# Patient Record
Sex: MALE | Race: ASIAN | NOT HISPANIC OR LATINO
[De-identification: names, ages, dates, MRNs, and addresses within clinical notes are randomized per-mention and may not be internally consistent; named-entity substitution may affect disease eponyms.]

---

## 2022-05-13 ENCOUNTER — NON-APPOINTMENT (OUTPATIENT)
Age: 42
End: 2022-05-13

## 2022-05-17 ENCOUNTER — APPOINTMENT (OUTPATIENT)
Dept: PULMONOLOGY | Facility: CLINIC | Age: 42
End: 2022-05-17
Payer: COMMERCIAL

## 2022-05-17 VITALS
WEIGHT: 175 LBS | BODY MASS INDEX: 24.5 KG/M2 | SYSTOLIC BLOOD PRESSURE: 134 MMHG | TEMPERATURE: 97.9 F | DIASTOLIC BLOOD PRESSURE: 77 MMHG | HEART RATE: 90 BPM | HEIGHT: 71 IN | OXYGEN SATURATION: 97 %

## 2022-05-17 DIAGNOSIS — R05.3 CHRONIC COUGH: ICD-10-CM

## 2022-05-17 DIAGNOSIS — J40 BRONCHITIS, NOT SPECIFIED AS ACUTE OR CHRONIC: ICD-10-CM

## 2022-05-17 PROBLEM — Z00.00 ENCOUNTER FOR PREVENTIVE HEALTH EXAMINATION: Status: ACTIVE | Noted: 2022-05-17

## 2022-05-17 PROCEDURE — 71046 X-RAY EXAM CHEST 2 VIEWS: CPT

## 2022-05-17 PROCEDURE — 94010 BREATHING CAPACITY TEST: CPT

## 2022-05-17 PROCEDURE — 99202 OFFICE O/P NEW SF 15 MIN: CPT | Mod: 25

## 2022-05-17 RX ORDER — PREDNISONE 20 MG/1
20 TABLET ORAL DAILY
Qty: 10 | Refills: 0 | Status: ACTIVE | COMMUNITY
Start: 2022-05-17 | End: 1900-01-01

## 2022-05-18 PROBLEM — R05.3 CHRONIC COUGH: Status: ACTIVE | Noted: 2022-05-17

## 2022-05-18 NOTE — PROCEDURE
[FreeTextEntry1] : No obstruction on Spirometery.  however fvc is LLN, is this truly his baseline\par CXR normal.

## 2022-05-18 NOTE — HISTORY OF PRESENT ILLNESS
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 He was diagnosed with COVID about 6 weeks ago and since he has had this annoying mostly dry cough, which is mostly present during the day. He has hx of sinus infection and sinus surgeries in the past. Seen ENT about 2 weeks ago , was told that there is no sinus infection. No fever, no sputum production/ weight loss / chest pain / SOB / GERD. \par Former cig smoker about 10 pack yrs, Smokes cigar every weekend. no vapin / e cigs. \par No exposure to dust , mold , chemicals.

## 2022-05-18 NOTE — PHYSICAL EXAM
[No Acute Distress] : no acute distress [Normal Oropharynx] : normal oropharynx [Nasal congestion] : nasal congestion [2+] : Right Tonsil: 2+ [Normal Appearance] : normal appearance [No Neck Mass] : no neck mass [Normal Rate/Rhythm] : normal rate/rhythm [Normal S1, S2] : normal s1, s2 [No Murmurs] : no murmurs [No Resp Distress] : no resp distress [Clear to Auscultation Bilaterally] : clear to auscultation bilaterally [No Abnormalities] : no abnormalities [Benign] : benign [Normal Gait] : normal gait [No Clubbing] : no clubbing [No Cyanosis] : no cyanosis [No Edema] : no edema [FROM] : FROM [Normal Color/ Pigmentation] : normal color/ pigmentation [No Focal Deficits] : no focal deficits [Oriented x3] : oriented x3 [Normal Affect] : normal affect

## 2022-05-18 NOTE — DISCUSSION/SUMMARY
[FreeTextEntry1] : This is most likely a case of chronic cough post COVID infection. No sign of active infection. NO new meds. CXR normal. no obstruction on PFT.  Would give him a trial of Prednisone 40 mg x 5 days and Bronchodilator. Nasal steroid prescribed as well. \par \par Come back in 2-3 weeks, early if needed.

## 2023-05-09 ENCOUNTER — APPOINTMENT (OUTPATIENT)
Dept: PULMONOLOGY | Facility: CLINIC | Age: 43
End: 2023-05-09
Payer: COMMERCIAL

## 2023-05-09 PROCEDURE — 94010 BREATHING CAPACITY TEST: CPT

## 2023-05-09 PROCEDURE — 99213 OFFICE O/P EST LOW 20 MIN: CPT | Mod: 25

## 2023-05-09 RX ORDER — PREDNISONE 20 MG/1
20 TABLET ORAL
Qty: 10 | Refills: 0 | Status: ACTIVE | COMMUNITY
Start: 2023-05-09 | End: 1900-01-01

## 2023-05-10 NOTE — HISTORY OF PRESENT ILLNESS
[TextBox_4] : has a cough for two weeks,\par \par has tendency to cough\par \par i saw him post covid\par \par now he is still couging after a simple cold\par \par i explained to him that you don't see a pulmonary specialist for a cold.\par \par no fever, clear sputum, no dyspnea

## 2023-05-10 NOTE — DISCUSSION/SUMMARY
[FreeTextEntry1] : i gave him a course of steroids against my better judegement since he was unable to accept having a cough for two weeks after a cold is normal\par \par he did not cough once in front of me

## 2023-05-25 ENCOUNTER — TRANSCRIPTION ENCOUNTER (OUTPATIENT)
Age: 43
End: 2023-05-25
